# Patient Record
Sex: MALE | Race: WHITE | Employment: FULL TIME | URBAN - METROPOLITAN AREA
[De-identification: names, ages, dates, MRNs, and addresses within clinical notes are randomized per-mention and may not be internally consistent; named-entity substitution may affect disease eponyms.]

---

## 2020-06-09 ENCOUNTER — COMMUNICATION - HEALTHEAST (OUTPATIENT)
Dept: SCHEDULING | Facility: CLINIC | Age: 55
End: 2020-06-09

## 2020-06-09 DIAGNOSIS — Z20.822 EXPOSURE TO 2019 NOVEL CORONAVIRUS: ICD-10-CM

## 2020-06-10 ENCOUNTER — AMBULATORY - HEALTHEAST (OUTPATIENT)
Dept: LAB | Facility: CLINIC | Age: 55
End: 2020-06-10

## 2020-06-10 DIAGNOSIS — Z20.822 EXPOSURE TO 2019 NOVEL CORONAVIRUS: ICD-10-CM

## 2020-06-11 ENCOUNTER — COMMUNICATION - HEALTHEAST (OUTPATIENT)
Dept: LAB | Facility: CLINIC | Age: 55
End: 2020-06-11

## 2020-06-11 LAB — COVID-19 ANTIBODY IGG: NEGATIVE

## 2021-03-20 ENCOUNTER — HEALTH MAINTENANCE LETTER (OUTPATIENT)
Age: 56
End: 2021-03-20

## 2021-06-08 NOTE — TELEPHONE ENCOUNTER
"  Patient is calling requesting COVID serologic antibody testing.  NOTE: Serologic testing is a blood test for 'antibodies' which are made at 10-14 days after you have had symptoms of COVID or were exposed and had an asymptomatic infection.  This does NOT test you for 'active' infection or tell you if you are contagious.    Are you a healthcare worker?  No  Do you currently have a cough, fever, body aches, shortness of breath or difficulty breathing?   No  Did you previously have cough, fever, body aches, shortness of breath, or difficulty breathing that have now resolved? Has had previous covid symptoms.   Symptoms began 90 days ago.  Symptoms started > 14 days ago. Lab order placed per SARS-CoV-2 Serology test Standing Order using indication \"Previously symptomatic >14d since onset, currently asymptomatic\" and diagnosis code \"Screening for viral disease\" (Z11.59)      The patient was informed: \"Testing is limited each day and it may take time for testing to be available to everyone who has called. You will receive a call within 48-72 hours to schedule the serology testing. Please confirm the best number to reach you is [phone]. If you have any questions about scheduling, call [phone].\"     Lissa Anthony RN  [facility] Nurse Advisor    "
no

## 2021-06-20 NOTE — LETTER
Letter by Dayana Vaughan RN at      Author: Dayana Vaughan RN Service: -- Author Type: --    Filed:  Encounter Date: 6/11/2020 Status: (Other)       6/11/2020        Wojciech Ward  [address] [place] [address]    No results found for: COVIDABYSCN    You have tested NEGATIVE for COVID-19 antibodies. This suggests you have not had or been exposed to COVID-19. But it does not mean that for sure.    The test finds antibodies in most people 10 days after they get sick. For some people, it takes longer than 10 days for antibodies to show up. Others may never show antibodies against COVID-19, especially if they have weak immune systems.    If you have COVID-19 symptoms now, please stay home and away from others.     Your current symptoms may or may not be COVID-19.     What is antibody testing?  This is a kind of blood test. We take a small sample of your blood, and then test it for something called antibodies.   Your body makes antibodies to fight infection. If your blood has antibodies for a certain germ, it means youve been infected with that germ in the past.   Sometimes, antibodies stay in your body for years after youve had the infection. They can be there even if the germ didnt make you sick. They are a sign that your body fought off the infection.  Will this test find antibodies in everyone whos had COVID-19?  No. The test finds antibodies in most people 10 days after they get sick. For some people, it takes longer than 10 days for antibodies to show up. Others may never show antibodies against COVID-19, especially if they have weak immune systems.  What are the signs of COVID-19?  Signs of COVID-19 can appear from 2 to 14 days (up to 2 weeks) after youre infected. Some people have no symptoms or only mild symptoms. Others get very sick. The most common symptoms are:      Cough    Shortness of breath or trouble breathing    Or at least 2 of these symptoms:      Fever    Chills    Repeated  shaking with chills    Muscle pain    Headache    Sore throat    Losing your sense of taste or smell    You may have other symptoms. Please contact your doctor or clinic for any symptoms that worry you.    Where can I get more information?     To learn the [facility] guidelines for staying home, please visit the [place] Department of Health website at [email or web address]    To learn more about COVID-19 and how to care for yourself at home, please visit the CDC website at https://www.cdc.gov/coronavirus/2019-ncov/about/steps-when-sick.html    For more options for care at [facility], please visit our website at [email or web address]    [facility] ([facility]) COVID-19 Hotline:  [phone]

## 2021-07-28 ENCOUNTER — TELEPHONE (OUTPATIENT)
Dept: CARE COORDINATION | Facility: CLINIC | Age: 56
End: 2021-07-28

## 2021-07-28 ENCOUNTER — TELEPHONE (OUTPATIENT)
Dept: NEUROLOGY | Facility: CLINIC | Age: 56
End: 2021-07-28

## 2021-07-28 NOTE — TELEPHONE ENCOUNTER
----- Message from Juliocesar Brewster GC sent at 7/28/2021 11:02 AM CDT -----  Regarding: schedule  Can you schedule Wojciech for a new video visit with me on 8/20 at 1:30 PM. He is aware of visit    Dinesh

## 2021-07-28 NOTE — PROGRESS NOTES
GENETIC COUNSELING-Palomo Jeffrey called me about his maternal family history of spinocerebellar ataxia type 6. He had many questions about the implications of testing and I suggested that we schedule a formal consult on 8/20 at 1:30 PM.    Dinesh Brewster MS, [facility]  Certified Genetic Counseor

## 2021-08-20 ENCOUNTER — VIRTUAL VISIT (OUTPATIENT)
Dept: NEUROLOGY | Facility: CLINIC | Age: 56
End: 2021-08-20
Payer: COMMERCIAL

## 2021-08-20 DIAGNOSIS — Z82.0 FAMILY HISTORY OF SPINOCEREBELLAR ATAXIA: Primary | ICD-10-CM

## 2021-08-20 PROCEDURE — 99207 PR NO CHARGE LOS: CPT | Performed by: GENETIC COUNSELOR, MS

## 2021-08-20 PROCEDURE — 99207 PR NO BILLABLE SERVICE THIS VISIT: CPT | Mod: GT | Performed by: GENETIC COUNSELOR, MS

## 2021-08-20 NOTE — PROGRESS NOTES
Wojciech is a 56 year old who is being evaluated via a billable video visit.      How would you like to obtain your AVS? MyChart  If the video visit is dropped, the invitation should be resent by:  Will anyone else be joining your video visit? No        Video-Visit Details    Type of service:  Video Visit    Originating Location (pt. Location): Home    Distant Location (provider location):  [facility] NEUROLOGY [facility]INIC [place]     Platform used for Video Visit: [employer]     Start 1:30 PM  End 1:59 PM    GENETIC COUNSELING-Ataxia clinic  I met with Wojciech Ward for a video visit due to his family history of Spinocerebellar ataxia type 6 (SCA6).  He is currently asymptomatic and considering predictive genetic testing.    MEDICAL HISTORY-  Wojciech has not yet been seen by our neurologist- he does plan to schedule in our clinic- he had wanted to meet with me today to discuss some issues related to predictive testing.    FAMILY HISTORY-see scanned pedigree  1. Wojciech's mother was diagnosed with SCA6 in her 70's and is currently 82. She is not a patient in our clinic and was seen in [place]. Wojciech indicated that she had genetic testing to confirm her diagnosis, but I did not have these results to review.  2. Wojciech's mother's sister is 86 years old and has SCA6 and is reported to have a positive genetic test.  3. Wojciech's maternal grandmother had symptoms consistent with SCA6 and  in her 80s. She did not have a genetic test.  4. No other significant family history reported.  5. Wojciech has 3 healthy adult children.    GENETIC COUNSELING-  We briefly discussed the genetic basis of SCA6. It is caused by expansion of a CAG repeat in the ATXN6 gene. Based on the family history, he is at 50% risk. If he tests positive, it would confirm that he would develop SCA6 and each of his children would be at 50% risk. If he is negative, he would not develop SCA6 and could not pass it to his children.  He indicated that he has  previously been ambivalent about testing, but is increasingly wanting to know his status so he can make plans for his work, life, and home.  One of his reservations has been the potential impact on insurance and employment. We discussed the protections for employment and health insurance afforded by the ANAY act- and the limitations of these protections specifically for life and disability insurance.  We did not have much chance to discuss his children's feelings about predictive testing as he had to end our consult early to go to another meeting. At the end of our consult, he indicated that he would like to schedule to see our physician, Dr. Nieto, and proceed with testing. I sent a message to our nurse, Geovanna Cobb, to schedule him for evaluation in our [facility].    Dinesh Brewster MS, [facility]  Certified Genetic COunselor

## 2021-08-20 NOTE — LETTER
2021       RE: Wojciech Ward  [address] [place] [address]     Dear Colleague,    Thank you for referring your patient, Wojciech Ward, to the [facility]UROLOG[facility]INIC [facility]. Please see a copy of my visit note below.    GENETIC COUNSELING-Ataxia clinic  I met with Wojciech Ward for a video visit due to his family history of Spinocerebellar ataxia type 6 (SCA6).  He is currently asymptomatic and considering predictive genetic testing.    MEDICAL HISTORY-  Wojciech has not yet been seen by our neurologist- he does plan to schedule in our clinic- he had wanted to meet with me today to discuss some issues related to predictive testing.    FAMILY HISTORY-see scanned pedigree  1. Wojciech's mother was diagnosed with SCA6 in her 70's and is currently 82. She is not a patient in our clinic and was seen in [place]. Wojciech indicated that she had genetic testing to confirm her diagnosis, but I did not have these results to review.  2. Wojciech's mother's sister is 86 years old and has SCA6 and is reported to have a positive genetic test.  3. Wojciech's maternal grandmother had symptoms consistent with SCA6 and  in her 80s. She did not have a genetic test.  4. No other significant family history reported.  5. Wojciech has 3 healthy adult children.    GENETIC COUNSELING-  We briefly discussed the genetic basis of SCA6. It is caused by expansion of a CAG repeat in the ATXN6 gene. Based on the family history, he is at 50% risk. If he tests positive, it would confirm that he would develop SCA6 and each of his children would be at 50% risk. If he is negative, he would not develop SCA6 and could not pass it to his children.  He indicated that he has previously been ambivalent about testing, but is increasingly wanting to know his status so he can make plans for his work, life, and home.  One of his reservations has been the potential impact  on insurance and employment. We discussed the protections for employment and health insurance afforded by the ANAY act- and the limitations of these protections specifically for life and disability insurance.  We did not have much chance to discuss his children's feelings about predictive testing as he had to end our consult early to go to another meeting. At the end of our consult, he indicated that he would like to schedule to see our physician, Dr. Nieto, and proceed with testing. I sent a message to our nurse, Geovanna Cobb, to schedule him for evaluation in our [facility].    Dinesh Brewster MS, [facility]  Certified Genetic COunselor        Again, thank you for allowing me to participate in the care of your patient.      Sincerely,    Juliocesar Brewster, GC

## 2021-08-24 ENCOUNTER — TELEPHONE (OUTPATIENT)
Dept: NEUROLOGY | Facility: CLINIC | Age: 56
End: 2021-08-24

## 2021-08-24 NOTE — TELEPHONE ENCOUNTER
CAlled pt and arranged for him to be seen in the ataxia clinic on Sept 17 at 7am. He is referred by Dinesh Brewster, genetic counselor. Pt's mom was dx with SCA6 and he told Dinesh that he would like to be tested. He does not have any symptoms.

## 2021-09-04 ENCOUNTER — HEALTH MAINTENANCE LETTER (OUTPATIENT)
Age: 56
End: 2021-09-04

## 2021-09-17 ENCOUNTER — OFFICE VISIT (OUTPATIENT)
Dept: NEUROLOGY | Facility: CLINIC | Age: 56
End: 2021-09-17
Payer: COMMERCIAL

## 2021-09-17 ENCOUNTER — LAB (OUTPATIENT)
Dept: LAB | Facility: CLINIC | Age: 56
End: 2021-09-17
Payer: COMMERCIAL

## 2021-09-17 VITALS
WEIGHT: 171 LBS | DIASTOLIC BLOOD PRESSURE: 78 MMHG | SYSTOLIC BLOOD PRESSURE: 115 MMHG | RESPIRATION RATE: 16 BRPM | OXYGEN SATURATION: 97 % | HEART RATE: 68 BPM | BODY MASS INDEX: 25.33 KG/M2 | HEIGHT: 69 IN

## 2021-09-17 DIAGNOSIS — Z82.0 FAMILY HISTORY OF SPINOCEREBELLAR ATAXIA: Primary | ICD-10-CM

## 2021-09-17 DIAGNOSIS — Z82.0 FAMILY HISTORY OF SPINOCEREBELLAR ATAXIA: ICD-10-CM

## 2021-09-17 PROCEDURE — 36415 COLL VENOUS BLD VENIPUNCTURE: CPT | Performed by: PATHOLOGY

## 2021-09-17 PROCEDURE — 99202 OFFICE O/P NEW SF 15 MIN: CPT | Performed by: PSYCHIATRY & NEUROLOGY

## 2021-09-17 PROCEDURE — 99207 PR NO CHARGE LOS: CPT | Performed by: GENETIC COUNSELOR, MS

## 2021-09-17 PROCEDURE — 81184 CACNA1A GEN DETC ABNOR ALLEL: CPT | Mod: 90 | Performed by: PATHOLOGY

## 2021-09-17 PROCEDURE — 99207 PR NO BILLABLE SERVICE THIS VISIT: CPT | Performed by: GENETIC COUNSELOR, MS

## 2021-09-17 RX ORDER — CHLORAL HYDRATE 500 MG
1000 CAPSULE ORAL
COMMUNITY

## 2021-09-17 ASSESSMENT — MIFFLIN-ST. JEOR: SCORE: 1601.03

## 2021-09-17 ASSESSMENT — PAIN SCALES - GENERAL: PAINLEVEL: NO PAIN (0)

## 2021-09-17 NOTE — PROGRESS NOTES
GENETIC COUNSELING-Ataxia clinic  I met with Wojciech Ward to discuss predictive genetic testing for SCA6.  I met with him previously for a video visit to review the genetics of SCA6 and issues to consider with predictive testing. Wojciech was evaluated today by Dr. Nieto and there are no clinical findings of SCA6 at the present time. This is reassuring, but SCA6 can have milder/later onset presentations and so we cannot exclude SCA6 on clinical grounds.  We again reviewed that Wojciech is at 50% risk for SCA6. If he tests NORMAL, then he will not develop SCA6 and cannot pass it to his children. If he tests POSITIVE, then we would expect him to develop SCA6 at some point in the future and each of his children would be at 50% risk.  All questions were answered. Testing was ordered through [facility]. I will call Wojciech with results.    Dinesh Brewster MS, [facility]  Certified Genetic Counselor

## 2021-09-17 NOTE — LETTER
9/17/2021       RE: Wojciech Ward  [address] [place] [address]     Dear Colleague,    Thank you for referring your patient, Wojciech Ward, to the [facility] NEUROLOGY CLINIC [facility]. Please see a copy of my visit note below.    Service Date: 09/17/2021    HISTORY OF PRESENT ILLNESS:  Mr. Ward is a 56-year-old man who was self-referred to the Ataxia Clinic for evaluation of genetic testing of spinocerebellar ataxia, type 6.  He does have a strong family history of SCA6 in his mother.  Please see Dinesh Brewster's note for details.  He reports no symptoms and he would like to have genetic testing to rule out this diagnosis.      PHYSICAL EXAMINATION:  His physical examination was unremarkable.  No nystagmus.  Cranial nerves were normal.  Speech was normal.  Strength, tendon reflexes, sensory examination and gait including tandem gait were all normal.    PLAN:  He will undergo genetic testing today, and Dinesh Brewster will contact him with the results.    Mayda Nieto MD

## 2021-09-17 NOTE — LETTER
9/17/2021       RE: Wojciech Ward  [address] [place] [address]     Dear Colleague,    Thank you for referring your patient, Wojciech Ward, to the [facility]OLOG[facility] CLIN[facility]. Please see a copy of my visit note below.    GENETIC COUNSELING-Ataxia clinic  I met with Wojciech Ward to discuss predictive genetic testing for SCA6.  I met with him previously for a video visit to review the genetics of SCA6 and issues to consider with predictive testing. Wojciech was evaluated today by Dr. Nieto and there are no clinical findings of SCA6 at the present time. This is reassuring, but SCA6 can have milder/later onset presentations and so we cannot exclude SCA6 on clinical grounds.  We again reviewed that Wojciech is at 50% risk for SCA6. If he tests NORMAL, then he will not develop SCA6 and cannot pass it to his children. If he tests POSITIVE, then we would expect him to develop SCA6 at some point in the future and each of his children would be at 50% risk.  All questions were answered. Testing was ordered through [facility]. I will call Wojciech with results.    Dinesh Brewster MS, [facility]  Certified Genetic Counselor    Addendum- I spent 20 minutes with the patient in clinic counseling and obtaining consent for genetic testing.    Dinesh Brewster MS, [facility]  Certified Genetic Counselor      Again, thank you for allowing me to participate in the care of your patient.      Sincerely,    Juliocesar Brewster GC

## 2021-09-17 NOTE — PROGRESS NOTES
Service Date: 2021    HISTORY OF PRESENT ILLNESS:  Mr. 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  He does have a strong family history of SCA6 in his mother.  Please see Dinesh Brewster's note for details.  He reports no symptoms and he would like to have genetic testing to rule out this diagnosis.      PHYSICAL EXAMINATION:  His physical examination was unremarkable.  No nystagmus.  Cranial nerves were normal.  Speech was normal.  Strength, tendon reflexes, sensory examination and gait including tandem gait were all normal.    PLAN:  He will undergo genetic testing today, and Dinesh Brewster will contact him with the results.    Mayda Nieto MD        D: 2021   T: 2021   MT: shasta    Name:     GERMAN KEEN  MRN:      [id number]        Account:      [id number]   :      1965           Service Date: 2021       Document: [id number]

## 2021-09-17 NOTE — PROGRESS NOTES
Addendum- I spent 20 minutes with the patient in clinic counseling and obtaining consent for genetic testing.    Dinesh Brewster MS, [facility]  Certified Genetic Counselor

## 2021-09-17 NOTE — NURSING NOTE
Pt states he does not have headaches. He does not have double or blurred vision. He said he does not choke when he eats or drinks. He sleeps fine and is not tired during the day. He does not stumble or fall. He does not5 have problems with constipation.

## 2021-10-15 ENCOUNTER — TELEPHONE (OUTPATIENT)
Dept: LAB | Facility: CLINIC | Age: 56
End: 2021-10-15

## 2021-10-15 LAB — SCANNED LAB RESULT: ABNORMAL

## 2021-10-15 NOTE — PROGRESS NOTES
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 Repeat numbers larger than 19 are considered abnormal- and based on this result, we would predict that he will develop SCA6 in the future.  He asked about ongoing clinical care given that he has no symptoms at the present time. I indicated that some of our patients like to see us year to year just to check in on clinical trials/clinical care. Other patients just check in if they start to develop symptoms. I indicated that it is important for him to continue to be updated through the [employer] Ataxia [employer] about research developments.  We also reviewed again that each of his children is at 50% risk and I am happy to speak with them if they have questions about predictive testing and/or reproductive options.    Dinesh Brewster MS, [facility]  Certified Genetic Counselor

## 2022-04-16 ENCOUNTER — HEALTH MAINTENANCE LETTER (OUTPATIENT)
Age: 57
End: 2022-04-16

## 2022-10-22 ENCOUNTER — HEALTH MAINTENANCE LETTER (OUTPATIENT)
Age: 57
End: 2022-10-22

## 2023-06-01 ENCOUNTER — HEALTH MAINTENANCE LETTER (OUTPATIENT)
Age: 58
End: 2023-06-01

## 2024-08-10 ENCOUNTER — HEALTH MAINTENANCE LETTER (OUTPATIENT)
Age: 59
End: 2024-08-10

## 2025-01-09 ENCOUNTER — HOSPITAL ENCOUNTER (EMERGENCY)
Facility: CLINIC | Age: 60
Discharge: HOME | End: 2025-01-09
Attending: EMERGENCY MEDICINE | Admitting: EMERGENCY MEDICINE
Payer: COMMERCIAL

## 2025-01-09 VITALS
HEART RATE: 69 BPM | DIASTOLIC BLOOD PRESSURE: 76 MMHG | RESPIRATION RATE: 17 BRPM | OXYGEN SATURATION: 97 % | BODY MASS INDEX: 27.54 KG/M2 | SYSTOLIC BLOOD PRESSURE: 115 MMHG | WEIGHT: 186.5 LBS | TEMPERATURE: 97.6 F

## 2025-01-09 DIAGNOSIS — Z20.3 NEED FOR POST EXPOSURE PROPHYLAXIS FOR RABIES: ICD-10-CM

## 2025-01-09 PROCEDURE — 250N000011 HC RX IP 250 OP 636: Performed by: EMERGENCY MEDICINE

## 2025-01-09 PROCEDURE — 90675 RABIES VACCINE IM: CPT | Performed by: EMERGENCY MEDICINE

## 2025-01-09 PROCEDURE — 90471 IMMUNIZATION ADMIN: CPT | Performed by: EMERGENCY MEDICINE

## 2025-01-09 PROCEDURE — 99284 EMERGENCY DEPT VISIT MOD MDM: CPT | Mod: 25 | Performed by: EMERGENCY MEDICINE

## 2025-01-09 PROCEDURE — 90375 RABIES IG IM/SC: CPT | Performed by: EMERGENCY MEDICINE

## 2025-01-09 PROCEDURE — 96372 THER/PROPH/DIAG INJ SC/IM: CPT | Performed by: EMERGENCY MEDICINE

## 2025-01-09 PROCEDURE — 99284 EMERGENCY DEPT VISIT MOD MDM: CPT | Performed by: EMERGENCY MEDICINE

## 2025-01-09 RX ADMIN — RABIES IMMUNE GLOBULIN (HUMAN) 1800 UNITS: 300 INJECTION, SOLUTION INFILTRATION; INTRAMUSCULAR at 03:30

## 2025-01-09 RX ADMIN — RABIES VIRUS STRAIN PM-1503-3M ANTIGEN (PROPIOLACTONE INACTIVATED) AND WATER 1 ML: KIT at 03:54

## 2025-01-09 ASSESSMENT — COLUMBIA-SUICIDE SEVERITY RATING SCALE - C-SSRS
6. HAVE YOU EVER DONE ANYTHING, STARTED TO DO ANYTHING, OR PREPARED TO DO ANYTHING TO END YOUR LIFE?: NO
2. HAVE YOU ACTUALLY HAD ANY THOUGHTS OF KILLING YOURSELF IN THE PAST MONTH?: NO
1. IN THE PAST MONTH, HAVE YOU WISHED YOU WERE DEAD OR WISHED YOU COULD GO TO SLEEP AND NOT WAKE UP?: NO

## 2025-01-09 ASSESSMENT — ACTIVITIES OF DAILY LIVING (ADL)
ADLS_ACUITY_SCORE: 41
ADLS_ACUITY_SCORE: 41

## 2025-01-10 NOTE — ED PROVIDER NOTES
"ED Provider Note  [facility]      History     Chief Complaint   Patient presents with    Bat Bite     Pt reports bat bite or scratch on LLL about 2 hrs ago. Pt reports woke with a bat flying in his room. \"No pain.\"     HPI  Wojciech Ward is a 59 year old male who presents the ED with concern for a bat bite.  He woke in his room and noted a bat flying around.  He then noted a scratch on his left shin that he thinks may be related to the bat.  He does not think he hit his leg on anything or injured it in any other way.  No history of rabies vaccination.  Occurred approximately 2 hours ago.  Thinks that the bat is still in his room.    Past Medical History  No past medical history on file.  No past surgical history on file.  cholecalciferol (VITAMIN D3) 25 mcg (1000 units) capsule  fish oil-omega-3 fatty acids 1000 MG capsule  IBUPROFEN 600 MG OR TABS  WELLBUTRIN XL# 150 MG OR TB24      Allergies   Allergen Reactions    No Known Drug Allergy      Family History  Family History   Problem Relation Age of Onset    Family History Negative No family hx of      Social History   Social History     Tobacco Use    Smoking status: Former     Current packs/day: 0.00     Types: Cigarettes     Quit date:      Years since quittin.0    Smokeless tobacco: Never   Substance Use Topics    Alcohol use: No    Drug use: No      A medically appropriate review of systems was performed with pertinent positives and negatives noted in the HPI, and all other systems negative.    Physical Exam   BP: 126/83  Pulse: 74  Temp: 97.3  F (36.3  C)  Resp: 16  Weight: 84.6 kg (186 lb 8 oz)  SpO2: 99 %  Physical Exam  Musculoskeletal:        Legs:       Comments: Superficial abrasion to the level of the epidermis left anterior leg.  No venous oozing.  No purulent discharge.  No surrounding erythema or fluctuance.           ED Course, Procedures, & Data        No results found for any visits on " 01/09/25.  Medications   rabies immune globulin 300 units/mL (HYPERAB) injection 1,800 Units (1,800 Units Intramuscular $Given 1/9/25 0330)   rabies vaccine,human diploid (IMOVAX) vaccine 1 mL (1 mL Intramuscular $Given 1/9/25 8225)     Labs Ordered and Resulted from Time of ED Arrival to Time of ED Departure - No data to display  No orders to display          Critical care was not performed.     Medical Decision Making  The patient's presentation was of moderate complexity (an acute complicated injury).    The patient's evaluation involved:  history and exam without other MDM data elements    The patient's management necessitated moderate risk (prescription drug management including medications given in the ED).    Assessment & Plan    Arrival, patient is normal vital signs.  He has a scratch on his lower extremity which potentially could be related to that contact.  He was given rabies immunoglobulin at the site of the injury as well as rabies vaccine.  Was scheduled for repeat vaccinations on days 3 7 and 14.  He will follow-up at the [facility] clinic.  Educated reasons to return to the ED.    I have reviewed the nursing notes. I have reviewed the findings, diagnosis, plan and need for follow up with the patient.    Discharge Medication List as of 1/9/2025  4:24 AM          Final diagnoses:   Need for post exposure prophylaxis for rabies       Kwesi Bhatia DO  [facility] EMERGENCY DEPARTMENT  1/9/2025     Kwesi Bhatia DO  01/10/25 0057

## 2025-01-12 ENCOUNTER — IMMUNIZATION (OUTPATIENT)
Dept: FAMILY MEDICINE | Facility: CLINIC | Age: 60
End: 2025-01-12
Attending: EMERGENCY MEDICINE
Payer: COMMERCIAL

## 2025-01-16 ENCOUNTER — IMMUNIZATION (OUTPATIENT)
Dept: FAMILY MEDICINE | Facility: CLINIC | Age: 60
End: 2025-01-16
Payer: COMMERCIAL

## 2025-01-16 DIAGNOSIS — Z23 NEED FOR PROPHYLACTIC VACCINATION AGAINST RABIES: Primary | ICD-10-CM

## 2025-01-23 ENCOUNTER — IMMUNIZATION (OUTPATIENT)
Dept: FAMILY MEDICINE | Facility: CLINIC | Age: 60
End: 2025-01-23
Payer: COMMERCIAL

## 2025-01-23 NOTE — PROGRESS NOTES
Patient Name: Wojciech Ward  Ordering Provider: SHAUNA Bhatia  Doses Given: 4  Doses Outstandin  Date Seen in ED: 2025  Date Follow-Up Needed: N/A

## 2025-07-25 ENCOUNTER — HOSPITAL ENCOUNTER (EMERGENCY)
Facility: CLINIC | Age: 60
Discharge: HOME | End: 2025-07-25
Attending: EMERGENCY MEDICINE | Admitting: EMERGENCY MEDICINE
Payer: COMMERCIAL

## 2025-07-25 ENCOUNTER — APPOINTMENT (OUTPATIENT)
Dept: CT IMAGING | Facility: CLINIC | Age: 60
End: 2025-07-25
Attending: EMERGENCY MEDICINE
Payer: COMMERCIAL

## 2025-07-25 VITALS
WEIGHT: 176.9 LBS | RESPIRATION RATE: 16 BRPM | SYSTOLIC BLOOD PRESSURE: 141 MMHG | OXYGEN SATURATION: 99 % | HEIGHT: 69 IN | HEART RATE: 70 BPM | TEMPERATURE: 98.4 F | DIASTOLIC BLOOD PRESSURE: 102 MMHG | BODY MASS INDEX: 26.2 KG/M2

## 2025-07-25 DIAGNOSIS — K80.20 CALCULUS OF GALLBLADDER WITHOUT CHOLECYSTITIS WITHOUT OBSTRUCTION: Primary | ICD-10-CM

## 2025-07-25 DIAGNOSIS — R10.32 LEFT LOWER QUADRANT ABDOMINAL PAIN: ICD-10-CM

## 2025-07-25 LAB
ALBUMIN SERPL BCG-MCNC: 3.8 G/DL (ref 3.5–5.2)
ALBUMIN UR-MCNC: NEGATIVE MG/DL
ALP SERPL-CCNC: 56 U/L (ref 40–150)
ALT SERPL W P-5'-P-CCNC: 33 U/L (ref 0–70)
ANION GAP SERPL CALCULATED.3IONS-SCNC: 12 MMOL/L (ref 7–15)
APPEARANCE UR: CLEAR
AST SERPL W P-5'-P-CCNC: 32 U/L (ref 0–45)
BASOPHILS # BLD AUTO: 0 10E3/UL (ref 0–0.2)
BASOPHILS NFR BLD AUTO: 1 %
BILIRUB SERPL-MCNC: 0.3 MG/DL
BILIRUB UR QL STRIP: NEGATIVE
BUN SERPL-MCNC: 17.5 MG/DL (ref 8–23)
CALCIUM SERPL-MCNC: 9.9 MG/DL (ref 8.8–10.4)
CAOX CRY #/AREA URNS HPF: ABNORMAL /HPF
CHLORIDE SERPL-SCNC: 104 MMOL/L (ref 98–107)
COLOR UR AUTO: YELLOW
CREAT SERPL-MCNC: 0.85 MG/DL (ref 0.67–1.17)
EGFRCR SERPLBLD CKD-EPI 2021: >90 ML/MIN/1.73M2
EOSINOPHIL # BLD AUTO: 0.1 10E3/UL (ref 0–0.7)
EOSINOPHIL NFR BLD AUTO: 1 %
ERYTHROCYTE [DISTWIDTH] IN BLOOD BY AUTOMATED COUNT: 12.8 % (ref 10–15)
GLUCOSE SERPL-MCNC: 89 MG/DL (ref 70–99)
GLUCOSE UR STRIP-MCNC: NEGATIVE MG/DL
HCO3 SERPL-SCNC: 25 MMOL/L (ref 22–29)
HCT VFR BLD AUTO: 42.2 % (ref 40–53)
HGB BLD-MCNC: 14 G/DL (ref 13.3–17.7)
HGB UR QL STRIP: NEGATIVE
IMM GRANULOCYTES # BLD: 0 10E3/UL
IMM GRANULOCYTES NFR BLD: 0 %
KETONES UR STRIP-MCNC: 20 MG/DL
LEUKOCYTE ESTERASE UR QL STRIP: NEGATIVE
LIPASE SERPL-CCNC: 33 U/L (ref 13–60)
LYMPHOCYTES # BLD AUTO: 2.4 10E3/UL (ref 0.8–5.3)
LYMPHOCYTES NFR BLD AUTO: 34 %
MCH RBC QN AUTO: 28.6 PG (ref 26.5–33)
MCHC RBC AUTO-ENTMCNC: 33.2 G/DL (ref 31.5–36.5)
MCV RBC AUTO: 86 FL (ref 78–100)
MONOCYTES # BLD AUTO: 0.9 10E3/UL (ref 0–1.3)
MONOCYTES NFR BLD AUTO: 13 %
MUCOUS THREADS #/AREA URNS LPF: PRESENT /LPF
NEUTROPHILS # BLD AUTO: 3.6 10E3/UL (ref 1.6–8.3)
NEUTROPHILS NFR BLD AUTO: 51 %
NITRATE UR QL: NEGATIVE
NRBC # BLD AUTO: 0 10E3/UL
NRBC BLD AUTO-RTO: 0 /100
PH UR STRIP: 5.5 [PH] (ref 5–7)
PLAT MORPH BLD: NORMAL
PLATELET # BLD AUTO: 336 10E3/UL (ref 150–450)
POTASSIUM SERPL-SCNC: 4.1 MMOL/L (ref 3.4–5.3)
PROT SERPL-MCNC: 6.7 G/DL (ref 6.4–8.3)
RBC # BLD AUTO: 4.9 10E6/UL (ref 4.4–5.9)
RBC MORPH BLD: NORMAL
RBC URINE: 1 /HPF
SODIUM SERPL-SCNC: 141 MMOL/L (ref 135–145)
SP GR UR STRIP: 1.03 (ref 1–1.03)
UROBILINOGEN UR STRIP-MCNC: NORMAL MG/DL
WBC # BLD AUTO: 7.1 10E3/UL (ref 4–11)
WBC URINE: 1 /HPF

## 2025-07-25 PROCEDURE — 250N000009 HC RX 250: Performed by: EMERGENCY MEDICINE

## 2025-07-25 PROCEDURE — 83690 ASSAY OF LIPASE: CPT | Performed by: EMERGENCY MEDICINE

## 2025-07-25 PROCEDURE — 74177 CT ABD & PELVIS W/CONTRAST: CPT

## 2025-07-25 PROCEDURE — 99284 EMERGENCY DEPT VISIT MOD MDM: CPT | Performed by: EMERGENCY MEDICINE

## 2025-07-25 PROCEDURE — 85025 COMPLETE CBC W/AUTO DIFF WBC: CPT | Performed by: EMERGENCY MEDICINE

## 2025-07-25 PROCEDURE — 250N000011 HC RX IP 250 OP 636: Performed by: EMERGENCY MEDICINE

## 2025-07-25 PROCEDURE — 82040 ASSAY OF SERUM ALBUMIN: CPT | Performed by: EMERGENCY MEDICINE

## 2025-07-25 PROCEDURE — 81003 URINALYSIS AUTO W/O SCOPE: CPT | Performed by: EMERGENCY MEDICINE

## 2025-07-25 PROCEDURE — 99285 EMERGENCY DEPT VISIT HI MDM: CPT | Mod: 25 | Performed by: EMERGENCY MEDICINE

## 2025-07-25 PROCEDURE — 36415 COLL VENOUS BLD VENIPUNCTURE: CPT | Performed by: EMERGENCY MEDICINE

## 2025-07-25 RX ORDER — IOPAMIDOL 755 MG/ML
500 INJECTION, SOLUTION INTRAVASCULAR ONCE
Status: COMPLETED | OUTPATIENT
Start: 2025-07-25 | End: 2025-07-25

## 2025-07-25 RX ADMIN — IOPAMIDOL 86 ML: 755 INJECTION, SOLUTION INTRAVENOUS at 20:05

## 2025-07-25 RX ADMIN — SODIUM CHLORIDE 44 ML: 9 INJECTION, SOLUTION INTRAVENOUS at 20:09

## 2025-07-25 ASSESSMENT — ACTIVITIES OF DAILY LIVING (ADL)
ADLS_ACUITY_SCORE: 41

## 2025-07-25 ASSESSMENT — COLUMBIA-SUICIDE SEVERITY RATING SCALE - C-SSRS
2. HAVE YOU ACTUALLY HAD ANY THOUGHTS OF KILLING YOURSELF IN THE PAST MONTH?: NO
1. IN THE PAST MONTH, HAVE YOU WISHED YOU WERE DEAD OR WISHED YOU COULD GO TO SLEEP AND NOT WAKE UP?: NO
6. HAVE YOU EVER DONE ANYTHING, STARTED TO DO ANYTHING, OR PREPARED TO DO ANYTHING TO END YOUR LIFE?: NO

## 2025-07-25 NOTE — ED PROVIDER NOTES
[facility] [place] ([facility])    25      ED PROVIDER NOTE     History     Chief Complaint   Patient presents with    Abdominal Pain     Pt experiencing LLQ pain and diarrhea and nausea.      The history is provided by the patient and medical records.     Wojciech Ward is a 60 year old male who presents to the ED for abdominal pain. Patient reports that last , he began having onset nausea with a low grade fever of over 100. Patient says he fell asleep and woke up Monday morning with abdominal bloatedness and diarrhea. He notes he then had acute sharp LLQ pain Tuesday night following previous symptoms. Patient says his pain felt worse with getting up to move and moving while laying in bed. He says here in the ED, his diarrhea and LLQ pain is still prominent. He says his dad has previous history of diverticulitis and wants to be evaluated d/t never having symptoms before. He is unsure if this is muscle pain or organ pain. He notes history of appendicitis. He reports he has had low output of urine at nights that is unassociated with his onset abdominal pain. Patient denies vomiting, fever, medication use for symptoms, and back pain. He denies pain in LLQ pain does not radiate to other areas of the abdomen.       Past Medical History  No past medical history on file.  No past surgical history on file.  cholecalciferol (VITAMIN D3) 25 mcg (1000 units) capsule  fish oil-omega-3 fatty acids 1000 MG capsule  IBUPROFEN 600 MG OR TABS  WELLBUTRIN XL# 150 MG OR TB24      Allergies   Allergen Reactions    No Known Drug Allergy      Family History  Family History   Problem Relation Age of Onset    Family History Negative No family hx of      Social History   Social History     Tobacco Use    Smoking status: Former     Current packs/day: 0.00     Types: Cigarettes     Quit date:      Years since quittin.5    Smokeless tobacco: Never   Substance Use Topics    Alcohol use: No    Drug  "use: No      A medically appropriate review of systems was performed with pertinent positives and negatives noted in the HPI, and all other systems negative.    Physical Exam   BP: 113/72  Pulse: 65  Temp: 98.4  F (36.9  C)  Resp: 17  Height: 175.3 cm (5' 9\")  Weight: 80.2 kg (176 lb 14.4 oz)  SpO2: 98 %  Physical Exam  Vitals and nursing note reviewed.   Constitutional:       General: He is not in acute distress.     Appearance: He is not toxic-appearing.   HENT:      Head: Normocephalic and atraumatic.      Nose: Nose normal.      Mouth/Throat:      Mouth: Mucous membranes are moist.      Pharynx: Oropharynx is clear.   Eyes:      Conjunctiva/sclera: Conjunctivae normal.      Pupils: Pupils are equal, round, and reactive to light.   Cardiovascular:      Rate and Rhythm: Normal rate and regular rhythm.      Heart sounds: No murmur heard.  Pulmonary:      Effort: Pulmonary effort is normal. No respiratory distress.      Breath sounds: No wheezing.   Abdominal:      General: There is no distension.      Palpations: Abdomen is soft.      Tenderness: There is no abdominal tenderness. There is no guarding.   Musculoskeletal:         General: Normal range of motion.   Skin:     General: Skin is warm and dry.   Neurological:      General: No focal deficit present.      Mental Status: He is alert and oriented to person, place, and time.           ED Course, Procedures, & Data      Procedures              Results for orders placed or performed during the hospital encounter of 07/25/25   CT Abdomen Pelvis w Contrast    Impression    IMPRESSION:   1.  No acute abnormality in the abdomen or pelvis.  2.  Cholelithiasis without evidence of acute cholecystitis or biliary obstruction.   Comprehensive Metabolic Panel (Limited Occurrences)   Result Value Ref Range    Sodium 141 135 - 145 mmol/L    Potassium 4.1 3.4 - 5.3 mmol/L    Carbon Dioxide (CO2) 25 22 - 29 mmol/L    Anion Gap 12 7 - 15 mmol/L    Urea Nitrogen 17.5 8.0 - 23.0 " mg/dL    Creatinine 0.85 0.67 - 1.17 mg/dL    GFR Estimate >90 >60 mL/min/1.73m2    Calcium 9.9 8.8 - 10.4 mg/dL    Chloride 104 98 - 107 mmol/L    Glucose 89 70 - 99 mg/dL    Alkaline Phosphatase 56 40 - 150 U/L    AST 32 0 - 45 U/L    ALT 33 0 - 70 U/L    Protein Total 6.7 6.4 - 8.3 g/dL    Albumin 3.8 3.5 - 5.2 g/dL    Bilirubin Total 0.3 <=1.2 mg/dL   Result Value Ref Range    Lipase 33 13 - 60 U/L   UA with Microscopic reflex to Culture    Specimen: Urine, Clean Catch   Result Value Ref Range    Color Urine Yellow Colorless, Straw, Light Yellow, Yellow    Appearance Urine Clear Clear    Glucose Urine Negative Negative mg/dL    Bilirubin Urine Negative Negative    Ketones Urine 20 (A) Negative mg/dL    Specific Gravity Urine 1.030 1.003 - 1.035    Blood Urine Negative Negative    pH Urine 5.5 5.0 - 7.0    Protein Albumin Urine Negative Negative mg/dL    Urobilinogen Urine Normal Normal mg/dL    Nitrite Urine Negative Negative    Leukocyte Esterase Urine Negative Negative    Mucus Urine Present (A) None Seen /LPF    Calcium Oxalate Crystals Urine Few (A) None Seen /HPF    RBC Urine 1 <=2 /HPF    WBC Urine 1 <=5 /HPF   CBC with platelets and differential   Result Value Ref Range    WBC Count 7.1 4.0 - 11.0 10e3/uL    RBC Count 4.90 4.40 - 5.90 10e6/uL    Hemoglobin 14.0 13.3 - 17.7 g/dL    Hematocrit 42.2 40.0 - 53.0 %    MCV 86 78 - 100 fL    MCH 28.6 26.5 - 33.0 pg    MCHC 33.2 31.5 - 36.5 g/dL    RDW 12.8 10.0 - 15.0 %    Platelet Count 336 150 - 450 10e3/uL    % Neutrophils 51 %    % Lymphocytes 34 %    % Monocytes 13 %    % Eosinophils 1 %    % Basophils 1 %    % Immature Granulocytes 0 %    NRBCs per 100 WBC 0 <1 /100    Absolute Neutrophils 3.6 1.6 - 8.3 10e3/uL    Absolute Lymphocytes 2.4 0.8 - 5.3 10e3/uL    Absolute Monocytes 0.9 0.0 - 1.3 10e3/uL    Absolute Eosinophils 0.1 0.0 - 0.7 10e3/uL    Absolute Basophils 0.0 0.0 - 0.2 10e3/uL    Absolute Immature Granulocytes 0.0 <=0.4 10e3/uL    Absolute NRBCs  0.0 10e3/uL   RBC and Platelet Morphology   Result Value Ref Range    RBC Morphology Confirmed RBC Indices     Platelet Assessment  Automated Count Confirmed. Platelet morphology is normal.     Automated Count Confirmed. Platelet morphology is normal.     Medications   iopamidol (ISOVUE-370) solution 500 mL (86 mLs Intravenous $Given 7/25/25 2005)   sodium chloride 0.9 % bag for CT scan flush (44 mLs Intravenous $Given 7/25/25 2009)          Critical care was not performed.     Medical Decision Making  The patient's presentation was of moderate complexity (an undiagnosed new problem with uncertain prognosis).    The patient's evaluation involved:  ordering and/or review of 3+ test(s) in this encounter (see separate area of note for details)    The patient's management necessitated moderate risk (IV contrast administration).    Assessment & Plan    This is a 60-year-old male presenting with abdominal pain.  On arrival he was afebrile and had reassuring vitals.  He noted some left lower quadrant pain and associated diarrhea.  He declined pain medication here.  Given the location of his pain and symptoms did consider diverticulitis.  Labs and a CT were ordered.  Labs did come back reassuring and showed no leukocytosis, no transaminitis, normal bilirubin.  Urinalysis did not show signs of UTI.  CT showed no diverticulitis or diverticulosis.  Incidentally some cholelithiasis was noted however this does seem less likely to the cause of his symptoms given that the pain is to the left lower quadrant, he has not had any postprandial pain, his reassuring labs.  He was reevaluated and results were discussed with him.  He reported feeling well.  I recommend he follow-up with primary care doctor and return precautions were discussed as well.  He was agreeable with this plan.  All questions answered.    I have reviewed the nursing notes. I have reviewed the findings, diagnosis, plan and need for follow up with the patient.    New  Prescriptions    No medications on file       Final diagnoses:   Calculus of gallbladder without cholecystitis without obstruction   Left lower quadrant abdominal pain   IZak Her, am serving as a trained medical scribe to document services personally performed by Gabriele Phillips MD, based on the provider's statements to me.     IGabriele MD, was physically present and have reviewed and verified the accuracy of this note documented by Zak Escobedo.     Gabriele Phillips MD  [facility] EMERGENCY DEPARTMENT  7/25/2025     Gabriele Phillips MD  07/25/25 9218

## 2025-07-25 NOTE — ED TRIAGE NOTES
Pt has been having LLQ abd pain since last Sunday. Pt states that he has been having watery bowel movements and nausea without vomiting. Pt states he has had a temperature this past week.      Triage Assessment (Adult)       Row Name 07/25/25 1184          Triage Assessment    Airway WDL WDL        Respiratory WDL    Respiratory WDL WDL        Skin Circulation/Temperature WDL    Skin Circulation/Temperature WDL WDL        Cardiac WDL    Cardiac WDL WDL        Peripheral/Neurovascular WDL    Peripheral Neurovascular WDL WDL        Cognitive/Neuro/Behavioral WDL    Cognitive/Neuro/Behavioral WDL WDL

## 2025-07-26 NOTE — DISCHARGE INSTRUCTIONS
As we discussed your CT scan did not show signs of diverticulitis or other infection.  We did see some gallstones but these are unlikely to be the cause of your the pain in your lower left abdomen.  Your blood work was also reassuring.  Please follow-up with your primary care doctor.  Symptoms are not improving they may refer you for a colonoscopy and you can also discuss with them if you need a regular screening colonoscopy.  If you have new or worsening symptoms or other concerns please return to the emergency department.   Quality 130: Documentation Of Current Medications In The Medical Record: Current Medications Documented Detail Level: Simple

## 2025-07-28 ENCOUNTER — HOSPITAL ENCOUNTER (EMERGENCY)
Facility: CLINIC | Age: 60
Discharge: HOME | End: 2025-07-28
Attending: EMERGENCY MEDICINE | Admitting: EMERGENCY MEDICINE
Payer: COMMERCIAL

## 2025-07-28 VITALS
HEIGHT: 69 IN | BODY MASS INDEX: 26.07 KG/M2 | RESPIRATION RATE: 16 BRPM | WEIGHT: 176 LBS | TEMPERATURE: 98 F | DIASTOLIC BLOOD PRESSURE: 69 MMHG | SYSTOLIC BLOOD PRESSURE: 115 MMHG | OXYGEN SATURATION: 97 % | HEART RATE: 77 BPM

## 2025-07-28 DIAGNOSIS — R10.9 ABDOMINAL WALL PAIN: Primary | ICD-10-CM

## 2025-07-28 PROCEDURE — 99282 EMERGENCY DEPT VISIT SF MDM: CPT | Performed by: EMERGENCY MEDICINE

## 2025-07-28 PROCEDURE — 99283 EMERGENCY DEPT VISIT LOW MDM: CPT | Performed by: EMERGENCY MEDICINE

## 2025-07-28 ASSESSMENT — ACTIVITIES OF DAILY LIVING (ADL): ADLS_ACUITY_SCORE: 41

## 2025-07-28 NOTE — ED TRIAGE NOTES
Having sharp pains on left lower abdominal area. Denies nausea, vomiting or fevers. Pt was seen here a few days ago over for the same complaints, denies it get worse, nor getting any better.

## 2025-07-28 NOTE — DISCHARGE INSTRUCTIONS
Please make an appointment to follow up with Your Primary Care Provider in 10-14 days.      Use acetaminophen 1000 mg every 6 hours or ibuprofen 600 mg every 6 hours as needed for discomfort.  You may use an abdominal binder for additional support.  Avoid any straining or heavy lifting for the next 2 weeks.    If you have worsening symptoms including increased pain, firm mass on the abdomen or discoloration of the abdominal wall, vomiting, bloody stools or fevers return to the emergency department for reevaluation.

## 2025-07-28 NOTE — ED PROVIDER NOTES
"    [facility]ENC[facility] ([facility])    7/28/25      ED PROVIDER NOTE      History     Chief Complaint   Patient presents with    Abdominal Pain     Left sided lower abdominal pain     HPI  Wojciech Ward is a 60 year old male who presents to the ED with left lower abdominal pain. Patient reports he had a stomach virus with nausea and fever last week. After his symptoms cleared he still had recurrent abdominal pain with movement and core engagement, so he came to this ED for assessment for diverticulitis. CT and labs were unremarkable.  He reports since that time his symptoms have gotten better with resolution of nausea and vomiting and normal bowel movements.  He has not had any fevers.  He denies any urinary symptoms.  His abdominal pain has improved with the exception that he has a sharp pain whenever he moves in his left lower quadrant.  This is associated with trying to sit up or with rotating his torso.  When resting he denies any pain.  He reports he has been doing a number of exercises recently including weightlifting.  Has not had any falls or traumatic injury.  He has not noted any skin changes.      CT ABDOMEN PELVIS W CONTRAST 7/25/2025  IMPRESSION:   1.  No acute abnormality in the abdomen or pelvis.  2.  Cholelithiasis without evidence of acute cholecystitis or biliary obstruction.       Physical Exam   BP: 115/69  Pulse: 77  Temp: 98  F (36.7  C)  Resp: 16  Height: 175.3 cm (5' 9\")  Weight: 79.8 kg (176 lb)  SpO2: 97 %  Physical Exam  General: patient is alert and oriented and in no acute distress   Head: atraumatic and normocephalic   EENT: moist mucus membranes without tonsillar erythema or exudates, pupils round and reactive   Neck: supple   Cardiovascular: regular rate and rhythm, extremities warm and well perfused, no lower extremity edema  Pulmonary: lungs clear to auscultation bilaterally   Abdomen: soft, localized tenderness to palpation in the left lower quadrant with " flexing of the abdomen, no inguinal hernia palpated, no overlying skin changes  Musculoskeletal: normal range of motion   Neurological: alert and oriented, moving all extremities symmetrically, gait normal   Skin: warm, dry     ED Course, Procedures, & Data      Procedures               Medications - No data to display       Critical care was not performed.     Medical Decision Making  The patient's presentation was of moderate complexity (an undiagnosed new problem with uncertain prognosis).    The patient's evaluation involved:  history and exam without other MDM data elements    The patient's management necessitated only low risk treatment.    Assessment & Plan    Wojciech Ward is a 60-year-old male without significant past medical history who presents to the emergency department due to left lower quadrant abdominal pain.  He is hemodynamically stable, afebrile and in no respiratory distress.  He was seen a few days ago and reports all of his symptoms have resolved including his GI symptoms and fevers and that he is feeling much better except for the lingering pain in his left lower quadrant associated with movement.  He reports when he is sitting or resting he does not have any pain but when he sits up or twists to engage his core he notes a sharp pain in the left lower quadrant.  On exam I do not appreciate any evidence of an inguinal hernia.  He does have a slight defect in the left lower quadrant of the abdominal wall with straining and question if he may have a strain of his abdominal wall versus developing ventral hernia.  His CT recently did not show any evidence of hernia and he does not have signs or symptoms consistent with incarceration or obstruction at this time.  I have offered laboratory evaluation but he would like to defer at this time.  Recommended conservative management including avoiding any straining, heavy lifting and using an abdominal wall binder for support.  Also will treat with NSAIDs and  have him follow-up with his primary care provider in 1 to 2 weeks for reevaluation.  He was given close return precautions and voiced understanding.    I have reviewed the nursing notes. I have reviewed the findings, diagnosis, plan and need for follow up with the patient.    Discharge Medication List as of 7/28/2025  5:05 PM          Final diagnoses:   Abdominal wall pain       I, Leonel Velasquez, am serving as a trained medical scribe to document services personally performed by Karly England MD based on the provider's statements to me on July 28, 2025.  This document has been checked and approved by the attending provider.    I, Karly England MD, was physically present and have reviewed and verified the accuracy of this note documented by Leonel Velasquez, medical scribe.      Karly England MD  [facility] EMERGENCY DEPARTMENT  7/28/2025     Karly England MD  07/28/25 2002

## 2025-08-16 ENCOUNTER — HEALTH MAINTENANCE LETTER (OUTPATIENT)
Age: 60
End: 2025-08-16